# Patient Record
Sex: MALE
[De-identification: names, ages, dates, MRNs, and addresses within clinical notes are randomized per-mention and may not be internally consistent; named-entity substitution may affect disease eponyms.]

---

## 2019-02-14 LAB
BASOPHILS # BLD AUTO: 0 10*3/UL (ref 0–0.1)
BASOPHILS NFR BLD AUTO: 0.5 % (ref 0–1)
DEPRECATED APTT PLAS QN: 26.4 SECONDS (ref 23.8–35.5)
DEPRECATED INR PLAS: 0.84
DEPRECATED NEUTROPHILS # BLD AUTO: 4.5 10*3/UL (ref 2.1–6.9)
EOSINOPHIL # BLD AUTO: 0.1 10*3/UL (ref 0–0.4)
EOSINOPHIL NFR BLD AUTO: 2 % (ref 0–6)
ERYTHROCYTE [DISTWIDTH] IN CORD BLOOD: 13.7 % (ref 11.7–14.4)
HCT VFR BLD AUTO: 44.2 % (ref 38.2–49.6)
HGB BLD-MCNC: 14.8 G/DL (ref 14–18)
LYMPHOCYTES # BLD: 1.2 10*3/UL (ref 1–3.2)
LYMPHOCYTES NFR BLD AUTO: 19.9 % (ref 18–39.1)
MCH RBC QN AUTO: 26.4 PG (ref 28–32)
MCHC RBC AUTO-ENTMCNC: 33.5 G/DL (ref 31–35)
MCV RBC AUTO: 78.8 FL (ref 81–99)
MONOCYTES # BLD AUTO: 0.2 10*3/UL (ref 0.2–0.8)
MONOCYTES NFR BLD AUTO: 2.8 % (ref 4.4–11.3)
NEUTS SEG NFR BLD AUTO: 74.5 % (ref 38.7–80)
PLATELET # BLD AUTO: 180 X10E3/UL (ref 140–360)
PROTHROMBIN TIME: 12.3 SECONDS (ref 11.9–14.5)
RBC # BLD AUTO: 5.61 X10E6/UL (ref 4.3–5.7)

## 2019-02-16 ENCOUNTER — HOSPITAL ENCOUNTER (OUTPATIENT)
Dept: HOSPITAL 88 - OR | Age: 59
Discharge: HOME | End: 2019-02-16
Attending: INTERNAL MEDICINE
Payer: COMMERCIAL

## 2019-02-16 DIAGNOSIS — Z86.19: ICD-10-CM

## 2019-02-16 DIAGNOSIS — K21.9: ICD-10-CM

## 2019-02-16 DIAGNOSIS — K63.5: ICD-10-CM

## 2019-02-16 DIAGNOSIS — K29.50: ICD-10-CM

## 2019-02-16 DIAGNOSIS — K57.30: ICD-10-CM

## 2019-02-16 DIAGNOSIS — K20.9: ICD-10-CM

## 2019-02-16 DIAGNOSIS — R12: ICD-10-CM

## 2019-02-16 DIAGNOSIS — I10: ICD-10-CM

## 2019-02-16 DIAGNOSIS — K64.8: ICD-10-CM

## 2019-02-16 DIAGNOSIS — Z12.11: Primary | ICD-10-CM

## 2019-02-16 DIAGNOSIS — B96.81: ICD-10-CM

## 2019-02-16 DIAGNOSIS — Z01.812: ICD-10-CM

## 2019-02-16 PROCEDURE — 85025 COMPLETE CBC W/AUTO DIFF WBC: CPT

## 2019-02-16 PROCEDURE — 43239 EGD BIOPSY SINGLE/MULTIPLE: CPT

## 2019-02-16 PROCEDURE — 85610 PROTHROMBIN TIME: CPT

## 2019-02-16 PROCEDURE — 93005 ELECTROCARDIOGRAM TRACING: CPT

## 2019-02-16 PROCEDURE — 36415 COLL VENOUS BLD VENIPUNCTURE: CPT

## 2019-02-16 PROCEDURE — 45384 COLONOSCOPY W/LESION REMOVAL: CPT

## 2019-02-16 PROCEDURE — 85730 THROMBOPLASTIN TIME PARTIAL: CPT

## 2019-02-16 NOTE — OPERATIVE REPORT
DATE OF PROCEDURE:  February 16, 2019 



REFERRING PHYSICIAN:  Dr. Trang Reyes.



PROCEDURES PERFORMED

1. Esophagogastroduodenoscopy with biopsies.

2. Colonoscopy with polypectomy.



INDICATIONS FOR EGD:  Heartburn.



INDICATIONS FOR COLONOSCOPY:  Colorectal cancer screening.



MEDICATION:  Patient was done under MAC.  Please see anesthesiologist's 

note.



PROCEDURE:  With the patient in the left lateral decubitus position, a 

flexible fiberoptic Olympus gastroscope was introduced into the esophagus 

under direct visualization without any difficulty.  There was some patchy 

erythema noted in distal esophagus.  The scope was then advanced with ease 

into the stomach and mucosa overlying the antrum and the body revealed some 

patchy erythema and low grade to moderate edema, and biopsies were obtained 

and sent to stain for H. pylori.  There was some patchy nodularity noted in 

the proximal body along the lesser curvature, and biopsies were obtained.  

Pylorus appeared to be of normal contour and shape.  It was intubated with 

ease, and the scope was advanced all the way to the 2nd portion of the 

duodenum.  The scope was then withdrawn slowly.  Mucosa overlying the 

proximal 2nd portion and the duodenal bulb appeared to be within normal 

limits.  The scope was then withdrawn back into the stomach and 

retroflexed.  Mucosa overlying the fundus and the cardia appeared to be 

within normal limits.  The scope was then straightened out.  It was 

subsequently withdrawn.  Patient tolerated the procedure well.



IMPRESSION

1. Distal esophagitis, mild.

2. Gastritis, biopsied.  Biopsies sent to stain for Helicobacter pylori.

3. Patchy nodularity, proximal body, lesser curvature, biopsied.



PLAN:  Follow up histology.  Initiate Protonix 40 mg 1 p.o. q.a.m. a.c.



Patient was then turned around; and after adequate lubrication of the anal 

canal, a flexible fiberoptic Olympus colonoscope was inserted into the 

rectum with ease and advanced all the way to the cecum.  It was then 

withdrawn slowly.  A single diverticulum was noted in the cecum.  The rest 

of the cecum appeared to be within normal limits.  The scope was then 

withdrawn slowly, and the mucosa overlying the ascending colon appeared to 

be within normal limits as well as the transverse and descending.  Some 

minimal diverticulosis was noted in the sigmoid colon.  A minute polyp was 

hot biopsied from the sigmoid colon.  The rectum appeared to be within 

normal limits.  The scope was then retroflexed into the distal rectum and 

moderate-sized internal hemorrhoids were noted, none of which was actively 

bleeding.  The scope was then straightened out.  It was subsequently 

withdrawn.  Patient tolerated the procedure well.



IMPRESSION

1. Diverticulosis.

2. Sigmoid colon polyp, minute, hot biopsied.

3. Internal hemorrhoids, none actively bleeding.



PLAN:  Follow up histology.  Initiate high-fiber, low-fat diet.  Initiate 

high-fiber supplement.  Start Anusol HC suppositories b.i.d. x 10 days, 

then p.r.n..  Patient might benefit from a followup colonoscopy in 5 years.









DD:  02/16/2019 14:28

DT:  02/16/2019 14:52

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



cc:TRANG REYES MD

## 2019-02-18 NOTE — XMS REPORT
Clinical Summary

                             Created on: 2019



Gurinder Andrea

External Reference #: [id number]

: 1960

Sex: Male



Demographics







                          Address                   [facility] [place] [place]  [address]

 

                          Home Phone                [phone]

 

                          Preferred Language        English

 

                          Marital Status            Unknown

 

                          [employer] Affiliation     Religious

 

                          Race                      White

 

                          Ethnic Group              /Latin





Author







                          Author                    [facility]

 

                          Address                   Unknown

 

                          Phone                     Unavailable







Support







                Name            Relationship    Address         Phone

 

                    Ciarra Johnson      GEORGE                SAME AS PATIENT

Unknown                                 [phone]







Care Team Providers







                    Care Team Member Name    Role                Phone

 

                    Donell Jayjay CHANEL    PCP                 [phone]







Allergies

No Known Allergies



Medications







                          End Date                  Status



              Medication     Sig          Dispensed     Refills      Start  



                                         Date  

 

                                                    Active



                     metoprolol (TOPROL-XL) 50     Take 50 mg by       0   



                           MG 24 hr tablet           mouth daily.     

 

                                                    Active



                     ALPRAZolam (XANAX) 0.5 MG     Take 0.5 mg         0   



                           tablet                    by mouth     



                                         every night     



                                         as needed for     



                                         Anxiety.     

 

                                                    Active



                     brimonidine (ALPHAGAN)     1 drop 3            0   



                           0.2 % ophthalmic solution     (three) times     



                                         daily.     

 

                                                    Active



                     difluprednate 0.05 % Drop     Apply to            0   



                                         eye(s) 4     



                                         (four) times     



                                         daily.     

 

                                                    Active



                     dorzolamide-timolol     1 drop 2            0   



                           (COSOPT) 22.3-6.8 mg/mL     (two) times     



                           ophthalmic solution       daily.     

 

                                                    Active



                     latanoprost (XALATAN)     1 drop              0   



                           0.005 % ophthalmic        nightly.     



                                         solution      

 

                                                    Active



                     lisinopril-hydrochlorothi     Take 1 tablet       0   



                           azide                     by mouth     



                           (PRINZIDE,ZESTORETIC)     daily.     



                                         10-12.5 mg per tablet      

 

                                                    Active



                     aspirin 81 MG EC tablet     Take 81 mg by       0   



                                         mouth daily.     

 

                                                    Active



                     metFORMIN (GLUCOPHAGE-XR)     Take 750 mg         0   



                           750 MG 24 hr tablet       by mouth     



                                         daily with     



                                         breakfast.     

 

                                                    Active



                     moxifloxacin (VIGAMOX)     1 drop 3            0   



                           0.5 % ophthalmic solution     (three) times     



                                         daily.     







Active Problems







 



                           Problem                   Noted Date

 

 



                           Primary open angle glaucoma of both eyes, severe stage     2016

 

 



                           Cataract, right eye       2014

 

 



                           Hepatitis C               2014

 

 



                           Hypertension              2014

 

 



                           Glaucoma                  2014







Social History







                                        Date



                 Tobacco Use     Types           Packs/Day       Years Used 

 

                                        Quit: 2003



                                         Former Smoker    









   



                 Alcohol Use     Drinks/Week     oz/Week         Comments

 

   



                           No                        sober 10 years









 



                           Sex Assigned at Birth     Date Recorded

 

 



                                         Not on file 









                                        Industry



                           Job Start Date            Occupation 

 

                                        Not on file



                           Not on file               Not on file 









                                        Travel End



                           Travel History            Travel Start 

 





                                         No recent travel history available.







Last Filed Vital Signs

Not on file



Plan of Treatment





Not on file



Implants







                    Device Identifier    Shelf Expiration Date    Model / Serial / Lot



                 Implanted       Type            Area            Manufactur   



                                         er   

 

                                        2017          [id number] /

                                        [id number] /





                     Iol,Acrysof Natural Wavefront 18.5     Ophthalmol          [employer]   



                     - [id number]     ogy                 [employer]   



                           Implanted: Qty: 1 on 2014 by       Viji Renee MD      

 

                                        2018          [id number] /

                                        [id number] /





                 Iol,Acrysof Natural Wavefront 17.5     Ophthalmol      Right: Eye      [employer]   



                     - [id number]     ogy                 [employer]   



                           Implanted: Qty: 1 on 2014 by       Viji Renee MD      







Results

Not on fileafter 2018



Insurance







     



            Payer      Benefit     Subscriber ID     Type       Phone      Address



                                         Plan /    



                                         Group    

 

     



                 [facility]      xxxxxxxxx       HMO/POS  



                           CARE                      POS SELECT    



                                         CHOICE    









     



            Guarantor Name     Account     Relation to     Date of     Phone      Billing Address



                     Type                Patient             Birth  

 

     



            Gurinder Andrea     Personal/F     Self       1960     [phone] [id number]               ([place])              [place] [address]